# Patient Record
Sex: FEMALE
[De-identification: names, ages, dates, MRNs, and addresses within clinical notes are randomized per-mention and may not be internally consistent; named-entity substitution may affect disease eponyms.]

---

## 2022-12-12 PROBLEM — Z00.00 ENCOUNTER FOR PREVENTIVE HEALTH EXAMINATION: Status: ACTIVE | Noted: 2022-12-12

## 2022-12-21 ENCOUNTER — APPOINTMENT (OUTPATIENT)
Dept: BREAST CENTER | Facility: CLINIC | Age: 57
End: 2022-12-21

## 2022-12-21 VITALS
TEMPERATURE: 98.6 F | DIASTOLIC BLOOD PRESSURE: 81 MMHG | HEART RATE: 65 BPM | HEIGHT: 67 IN | SYSTOLIC BLOOD PRESSURE: 134 MMHG | RESPIRATION RATE: 16 BRPM | OXYGEN SATURATION: 100 % | BODY MASS INDEX: 24.33 KG/M2 | WEIGHT: 155 LBS

## 2022-12-21 DIAGNOSIS — Z86.79 PERSONAL HISTORY OF OTHER DISEASES OF THE CIRCULATORY SYSTEM: ICD-10-CM

## 2022-12-21 DIAGNOSIS — Z80.3 FAMILY HISTORY OF MALIGNANT NEOPLASM OF BREAST: ICD-10-CM

## 2022-12-21 DIAGNOSIS — Z78.9 OTHER SPECIFIED HEALTH STATUS: ICD-10-CM

## 2022-12-21 DIAGNOSIS — R92.2 INCONCLUSIVE MAMMOGRAM: ICD-10-CM

## 2022-12-21 PROCEDURE — 99203 OFFICE O/P NEW LOW 30 MIN: CPT

## 2022-12-21 RX ORDER — TELMISARTAN 80 MG/1
80 TABLET ORAL
Refills: 0 | Status: ACTIVE | COMMUNITY

## 2022-12-21 NOTE — ASSESSMENT
[FreeTextEntry1] : 58 yo female presents for right breast pain. Discussed etiologies of breast pain including hormonal changes, benign entities such as cysts, reactive lymph nodes, musculoskeletal issues and rarely malignancy. Reassured her that breast pain is not considered a sign of breast cancer. I reviewed keeping a symptom calendar, the use of OTC Ibuprofen, warm/cold compresses, stretching, decreasing caffeine intake (includes coffee, tea, chocolate, energy drinks, soda), wearing a sports bra, Salon Pas patches, and evening primrose oil as a last resort. \par \par Reviewed her imaging from September, which was benign but she does have dense tissue and requires a bilateral screening ultrasound (only a limited right was completed). She will get this done at [facility], most likely in January.

## 2022-12-21 NOTE — DATA REVIEWED
[FreeTextEntry1] : 3/24/221 ([facility]) b/l sMmg and b/l US: heterogenously dense. No suspicious findings. BI-RADS 1.\par \par 9/23/22 ([facility]) bilateral diagnostic mammogram and targeted right US: heterogenously dense. No evidence of malignancy. BI-RADS 2.

## 2022-12-21 NOTE — PAST MEDICAL HISTORY
[Postmenopausal] : The patient is postmenopausal [Menarche Age ____] : age at menarche was [unfilled] [Menopause Age____] : age at menopause was [unfilled] [Approximately ___] : the LMP was approximately [unfilled] [Regular Cycle Intervals] : have been regular [Total Preg ___] : G[unfilled] [History of Hormone Replacement Treatment] : has no history of hormone replacement treatment [FreeTextEntry5] : Fibroids [FreeTextEntry6] : None [FreeTextEntry7] : in the past [FreeTextEntry8] : None

## 2022-12-21 NOTE — PHYSICAL EXAM
[Normocephalic] : normocephalic [No Supraclavicular Adenopathy] : no supraclavicular adenopathy [Examined in the supine and seated position] : examined in the supine and seated position [Symmetrical] : symmetrical [No dominant masses] : no dominant masses in right breast  [No dominant masses] : no dominant masses left breast [No Nipple Retraction] : no left nipple retraction [No Nipple Discharge] : no left nipple discharge [Breast Nipple Inversion] : nipples not inverted [Breast Nipple Retraction] : nipples not retracted [Breast Nipple Fissures] : nipples not fissured [Breast Nipple Flattening] : nipples not flattened [No Axillary Lymphadenopathy] : no left axillary lymphadenopathy [No Edema] : no edema [No Rashes] : no rashes [No Ulceration] : no ulceration

## 2022-12-21 NOTE — HISTORY OF PRESENT ILLNESS
[FreeTextEntry1] : 58 yo female self referred presents for right sided breast pain, first noted in September; the pain is intermittent, described a throbbing pain and is tender to palpation. She states that the pain is in a focal area in the superior breast. Denies lumps, nipple discharge, skin changes. The patient is a , notes heavy lifting for her occupation. \par \par YONATAN lifetime risk is 12.3% maternal aunt had breast cancer diagnosed around age 60

## 2022-12-21 NOTE — REVIEW OF SYSTEMS
[Fever] : no fever [Chills] : no chills [As Noted in HPI] : as noted in HPI [Breast Pain] : breast pain [Breast Lump] : no breast lump [Negative] : Heme/Lymph

## 2023-04-18 ENCOUNTER — NON-APPOINTMENT (OUTPATIENT)
Age: 58
End: 2023-04-18

## 2023-06-21 ENCOUNTER — APPOINTMENT (OUTPATIENT)
Dept: BREAST CENTER | Facility: CLINIC | Age: 58
End: 2023-06-21

## 2023-06-29 ENCOUNTER — NON-APPOINTMENT (OUTPATIENT)
Age: 58
End: 2023-06-29

## 2023-06-30 ENCOUNTER — APPOINTMENT (OUTPATIENT)
Dept: BREAST CENTER | Facility: CLINIC | Age: 58
End: 2023-06-30

## 2023-07-28 ENCOUNTER — APPOINTMENT (OUTPATIENT)
Dept: BREAST CENTER | Facility: CLINIC | Age: 58
End: 2023-07-28

## 2023-08-01 ENCOUNTER — NON-APPOINTMENT (OUTPATIENT)
Age: 58
End: 2023-08-01

## 2023-08-09 ENCOUNTER — NON-APPOINTMENT (OUTPATIENT)
Age: 58
End: 2023-08-09

## 2023-08-11 ENCOUNTER — APPOINTMENT (OUTPATIENT)
Dept: BREAST CENTER | Facility: CLINIC | Age: 58
End: 2023-08-11

## 2023-08-31 ENCOUNTER — APPOINTMENT (OUTPATIENT)
Dept: BREAST CENTER | Facility: CLINIC | Age: 58
End: 2023-08-31
Payer: COMMERCIAL

## 2023-08-31 VITALS
HEART RATE: 75 BPM | BODY MASS INDEX: 28.25 KG/M2 | HEIGHT: 67 IN | DIASTOLIC BLOOD PRESSURE: 82 MMHG | OXYGEN SATURATION: 98 % | WEIGHT: 180 LBS | SYSTOLIC BLOOD PRESSURE: 134 MMHG

## 2023-08-31 DIAGNOSIS — N64.4 MASTODYNIA: ICD-10-CM

## 2023-08-31 PROCEDURE — 99212 OFFICE O/P EST SF 10 MIN: CPT

## 2023-08-31 NOTE — PHYSICAL EXAM
[Normocephalic] : normocephalic [No Supraclavicular Adenopathy] : no supraclavicular adenopathy [Examined in the supine and seated position] : examined in the supine and seated position [Symmetrical] : symmetrical [No dominant masses] : no dominant masses in right breast  [No dominant masses] : no dominant masses left breast [No Nipple Retraction] : no left nipple retraction [No Nipple Discharge] : no left nipple discharge [No Axillary Lymphadenopathy] : no left axillary lymphadenopathy [No Edema] : no edema [No Rashes] : no rashes [No Ulceration] : no ulceration [Breast Nipple Inversion] : nipples not inverted [Breast Nipple Retraction] : nipples not retracted [Breast Nipple Flattening] : nipples not flattened [Breast Nipple Fissures] : nipples not fissured

## 2023-08-31 NOTE — ASSESSMENT
[FreeTextEntry1] : 57 yo female presents for follow-up right breast pain. Discussed etiologies of breast pain including hormonal changes, benign entities such as cysts, reactive lymph nodes, musculoskeletal issues and rarely malignancy. Reassured her that breast pain is not considered a sign of breast cancer. I reviewed keeping a symptom calendar, the use of OTC Ibuprofen, warm/cold compresses, stretching, decreasing caffeine intake (includes coffee, tea, chocolate, energy drinks, soda), wearing a sports bra, and Salon Pas patches.  Reviewed her benign mammo/US from 8/8/23; discussed she should proceed within imaging in August 2024. Explained that if the pain is improved, and imaging next year is benign, she can RTC PRN, but will schedule a follow-up appointment which she can cancel pending above. All questions were answered; the patient verbalized understanding and is in agreement with the plan.

## 2023-08-31 NOTE — HISTORY OF PRESENT ILLNESS
[FreeTextEntry1] : 59 yo female presents for follow-up of right sided breast pain; the pain is intermittent, has improved somewhat but not fully resolved. Described a throbbing pain and is tender to palpation. She states that the pain is in a focal area in the superior breast. Denies lumps, nipple discharge, skin changes. The patient is a , and also is a , notes heavy lifting for her occupation.   YONATAN lifetime risk is 12.3% maternal aunt had breast cancer diagnosed around age 60

## 2023-08-31 NOTE — DATA REVIEWED
[FreeTextEntry1] : 3/24/221 ([facility]) b/l sMmg and b/l US: heterogenously dense. No suspicious findings. BI-RADS 1.  9/23/22 ([facility]) bilateral diagnostic mammogram and targeted right US: heterogenously dense. No evidence of malignancy. BI-RADS 2.   8/8/23 ([facility]) b/l screening mammo and US: scattered fbg density. Benign findings. No evidence of malignancy. BI-RADS 2.

## 2023-08-31 NOTE — REVIEW OF SYSTEMS
[As Noted in HPI] : as noted in HPI [Breast Pain] : breast pain [Negative] : Heme/Lymph [Fever] : no fever [Chills] : no chills [Breast Lump] : no breast lump

## 2023-08-31 NOTE — PLAN
[TextEntry] : Interventions as noted for breast pain Mammo & US in August 2024 RTC in 1 year or PRN

## 2024-08-06 ENCOUNTER — NON-APPOINTMENT (OUTPATIENT)
Age: 59
End: 2024-08-06

## 2024-09-27 ENCOUNTER — APPOINTMENT (OUTPATIENT)
Dept: BREAST CENTER | Facility: CLINIC | Age: 59
End: 2024-09-27